# Patient Record
Sex: FEMALE | Race: WHITE
[De-identification: names, ages, dates, MRNs, and addresses within clinical notes are randomized per-mention and may not be internally consistent; named-entity substitution may affect disease eponyms.]

---

## 2017-06-30 ENCOUNTER — HOSPITAL ENCOUNTER (EMERGENCY)
Dept: HOSPITAL 10 - FTE | Age: 32
Discharge: HOME | End: 2017-06-30
Payer: MEDICAID

## 2017-06-30 VITALS
HEIGHT: 66 IN | HEIGHT: 66 IN | BODY MASS INDEX: 30.68 KG/M2 | WEIGHT: 190.92 LBS | BODY MASS INDEX: 30.68 KG/M2 | WEIGHT: 190.92 LBS
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
## 2017-06-30 DIAGNOSIS — Z23: ICD-10-CM

## 2017-06-30 DIAGNOSIS — S61.452A: Primary | ICD-10-CM

## 2017-06-30 PROCEDURE — 90471 IMMUNIZATION ADMIN: CPT

## 2017-06-30 PROCEDURE — 90715 TDAP VACCINE 7 YRS/> IM: CPT

## 2017-06-30 NOTE — ERD
ER Documentation


Chief Complaint


Date/Time


DATE: 6/30/17 


TIME: 19:50


Chief Complaint


dog bite to left hand at 1300





HPI


Is a 31-year-old female who presents the emergency department today complaining 

of a dog bite to her left hand.  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  She is not up-to-date on her 

vaccines.  Denies any previous trauma, fevers or chills.





ROS


All systems reviewed and are negative except as per history of present illness.





Medications


Home Meds


Active Scripts


Amoxicillin/Potassium Clav (Amox-Clav 875-125 mg Tablet) 875-125 mg Tab, 1 TAB 

PO BID for 7 Days, #14 TAB


   Prov:NAVNEET MORA PA-C         6/30/17


Acetaminophen* (Tylophen*) 500 Mg Capsule, 1 CAP PO Q6H Y for PAIN AND OR 

ELEVATED TEMP, #30 CAP


   Prov:NAVNEET MORAC         6/30/17


Ibuprofen* (Motrin*) 600 Mg Tab, 600 MG PO Q6, #30 TAB


   Prov:NAVNEET MORAC         6/30/17


Ibuprofen* (Motrin*) 600 Mg Tab, 600 MG PO Q6H Y for PAIN AND OR ELEVATED TEMP, 

#30 TAB


   Prov:PHILIP YODER NP         3/12/16


Reported Medications


Multivitamins* (Once Daily*) 1 Tab Tablet, 1 TAB PO DAILY, TAB


   9/13/15





Allergies


Allergies:  


Coded Allergies:  


     No Known Drug Allergy (Verified  Allergy, Unknown, 6/30/17)





PMhx/Soc


Medical and Surgical Hx:  pt denies Medical Hx


History of Surgery:  Yes (c/section x1, laparoscopic)


Anesthesia Reaction:  No


Hx Neurological Disorder:  No


Hx Respiratory Disorders:  No


Hx Cardiac Disorders:  No


Hx Psychiatric Problems:  No


Hx Miscellaneous Medical Probl:  No


Hx Alcohol Use:  No


Hx Substance Use:  No


Hx Tobacco Use:  No


Smoking Status:  Never smoker





Physical Exam


Vitals





Vital Signs








  Date Time  Temp Pulse Resp B/P Pulse Ox O2 Delivery O2 Flow Rate FiO2


 


6/30/17 19:25 98.6 79 20 121/72 100   








Physical Exam


Const:     No acute distress


Head:   Atraumatic 


Eyes:    Normal Conjunctiva


ENT:    Normal External Ears, Nose and Mouth.


Neck:               Full range of motion..~ No meningismus.


Resp:    Clear to auscultation bilaterally


Cardio:    Regular rate and rhythm, no murmurs


Abd:    Soft, non tender, non distended. Normal bowel sounds


Skin:   Left hand thenar eminence with evidence of superficial dog bite and 

mild blood.  No erythema or warmth.


MSK:   Left hand with no obvious deformity.  No effusion.  No ecchymosis.  

Evidence of superficial dog bite.  Pulses 2+.  Distal neurovascularly intact.  

Full active range of motion of thumb.  Nontender palpation thumb.


Neur:    Awake and alert


Psych:    Normal Mood and Affect


Results 24 hrs








 Current Medications








 Medications


  (Trade)  Dose


 Ordered  Sig/Sintia


 Route


 PRN Reason  Start Time


 Stop Time Status Last Admin


Dose Admin


 


 Diphtheria/


 Tetanus/Acell


 Pertussis


  (Adacel)  0.5 ml  ONCE ONCE


 IM*


   6/30/17 20:00


 6/30/17 20:01 DC 6/30/17 19:57


 


 


 Ibuprofen


  (Motrin)  600 mg  ONCE  ONCE


 PO


   6/30/17 20:00


 6/30/17 20:01 DC 6/30/17 19:57


 














Procedures/MDM


This a 31-year-old female presents the emergency department today for a dog 

bite that she sustained earlier today while cleaning a house.  Patient has 

evidence of a superficial dog bite over her thenar eminence of her left hand.  

She does have full active range of motion of her thumb and is nontender on the 

bones of her thumb is able to oppose all of her fingers.  Low suspicion for 

acute fracture dislocation.  I do not feel that she requires imaging at this 

time..  There is no erythema or warmth.  Patient symptoms at this time is 

consistent with dog bite.  She is not up-to-date on her tetanus.  She was given 

a tetanus here in the emergency department as well as Motrin.  Patient's wounds 

were explained here in the emergency department.  I will give her prescription 

for Augmentin, Tylenol and Motrin.  She was instructed to return in 48 hours 

for wound check if no improvement in symptoms or worsening of symptoms.





At this time the patient is stable for discharge and outpatient management. 

Patient should follow up with their PCP in the next 1-2 days.  They may return 

to the emergency department sooner for any persistent or worsening of symptoms.

  Patient understood and agreed with the plan.





Departure


Diagnosis:  


 Primary Impression:  


 Dog bite


 Encounter type:  initial encounter  Qualified Code:  W54.0XXA - Dog bite, 

initial encounter


Condition:  NAVNEET Auguste PA-C Jun 30, 2017 19:53

## 2017-08-07 ENCOUNTER — HOSPITAL ENCOUNTER (EMERGENCY)
Dept: HOSPITAL 10 - FTE | Age: 32
LOS: 1 days | Discharge: HOME | End: 2017-08-08
Payer: MEDICAID

## 2017-08-07 VITALS
BODY MASS INDEX: 31 KG/M2 | HEIGHT: 66 IN | HEIGHT: 66 IN | WEIGHT: 192.9 LBS | WEIGHT: 192.9 LBS | BODY MASS INDEX: 31 KG/M2

## 2017-08-07 DIAGNOSIS — R10.30: ICD-10-CM

## 2017-08-07 DIAGNOSIS — N93.8: Primary | ICD-10-CM

## 2017-08-07 LAB
BASOPHILS # BLD AUTO: 0.1 10^3/UL (ref 0–0.1)
BASOPHILS NFR BLD: 0.6 % (ref 0–2)
EOSINOPHIL # BLD: 0.2 10^3/UL (ref 0–0.5)
EOSINOPHIL NFR BLD: 2.2 % (ref 0–7)
ERYTHROCYTE [DISTWIDTH] IN BLOOD BY AUTOMATED COUNT: 13.2 % (ref 11.5–14.5)
HCT VFR BLD CALC: 37.4 % (ref 37–47)
HGB BLD-MCNC: 12.5 G/DL (ref 12–16)
LYMPHOCYTES # BLD AUTO: 3.6 10^3/UL (ref 0.8–2.9)
LYMPHOCYTES NFR BLD AUTO: 37.1 % (ref 15–51)
MCH RBC QN AUTO: 29.9 PG (ref 29–33)
MCHC RBC AUTO-ENTMCNC: 33.4 G/DL (ref 32–37)
MCV RBC AUTO: 89.5 FL (ref 82–101)
MONOCYTES # BLD: 0.6 10^3/UL (ref 0.3–0.9)
MONOCYTES NFR BLD: 6 % (ref 0–11)
NEUTROPHILS # BLD: 5.2 10^3/UL (ref 1.6–7.5)
NEUTROPHILS NFR BLD AUTO: 53.7 % (ref 39–77)
NRBC # BLD MANUAL: 0 10^3/UL (ref 0–0)
NRBC BLD AUTO-RTO: 0 /100WBC (ref 0–0)
PLATELET # BLD: 259 10^3/UL (ref 140–415)
PMV BLD AUTO: 10.5 FL (ref 7.4–10.4)
RBC # BLD AUTO: 4.18 10^6/UL (ref 4.2–5.4)
WBC # BLD AUTO: 9.7 10^3/UL (ref 4.8–10.8)

## 2017-08-07 PROCEDURE — 80053 COMPREHEN METABOLIC PANEL: CPT

## 2017-08-07 PROCEDURE — 85025 COMPLETE CBC W/AUTO DIFF WBC: CPT

## 2017-08-07 PROCEDURE — 83690 ASSAY OF LIPASE: CPT

## 2017-08-07 PROCEDURE — 76830 TRANSVAGINAL US NON-OB: CPT

## 2017-08-07 PROCEDURE — 74176 CT ABD & PELVIS W/O CONTRAST: CPT

## 2017-08-07 PROCEDURE — 36415 COLL VENOUS BLD VENIPUNCTURE: CPT

## 2017-08-07 PROCEDURE — 76856 US EXAM PELVIC COMPLETE: CPT

## 2017-08-07 PROCEDURE — 81001 URINALYSIS AUTO W/SCOPE: CPT

## 2017-08-07 NOTE — ERD
ER Documentation


Chief Complaint


Date/Time


DATE: 8/7/17 


TIME: 23:25


Chief Complaint


vag bleeding w/ back pain x  week





HPI


31-year-old female presents here in emergency department for complaints of 

vaginal bleeding for one, patient discussed obtaining a spotting. Patient is 

complaining of lower back pain with this. Sharp pains 4/10 scale, not better or 

worse with anything. Patient noticed abdominal area to be bloating and bigger. 

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 

back. Patient wants this to be checked. Patient denies any dizziness.





ROS


All systems reviewed and are negative except as per history of present illness.





Medications


Home Meds


Active Scripts


Amoxicillin/Potassium Clav (Amox-Clav 875-125 mg Tablet) 875-125 mg Tab, 1 TAB 

PO BID for 7 Days, #14 TAB


   Prov:NAVNEET MORAC         6/30/17


Acetaminophen* (Tylophen*) 500 Mg Capsule, 1 CAP PO Q6H Y for PAIN AND OR 

ELEVATED TEMP, #30 CAP


   Prov:NAVNEET MORA PA-C         6/30/17


Ibuprofen* (Motrin*) 600 Mg Tab, 600 MG PO Q6, #30 TAB


   Prov:NAVNEET MORA PA-C         6/30/17


Ibuprofen* (Motrin*) 600 Mg Tab, 600 MG PO Q6H Y for PAIN AND OR ELEVATED TEMP, 

#30 TAB


   Prov:PHILIP YODER NP         3/12/16


Reported Medications


Multivitamins* (Once Daily*) 1 Tab Tablet, 1 TAB PO DAILY, TAB


   9/13/15





Allergies


Allergies:  


Coded Allergies:  


     No Known Drug Allergy (Verified  Allergy, Unknown, 6/30/17)





PMhx/Soc


History of Surgery:  Yes (c/section x1, laparoscopic)


Anesthesia Reaction:  No


Hx Neurological Disorder:  No


Hx Respiratory Disorders:  No


Hx Cardiac Disorders:  No


Hx Psychiatric Problems:  No


Hx Miscellaneous Medical Probl:  No


Hx Alcohol Use:  No


Hx Substance Use:  No


Hx Tobacco Use:  No





FmHx


Family History:  No coronary disease, No diabetes, No other





Physical Exam


Vitals





Vital Signs








  Date Time  Temp Pulse Resp B/P Pulse Ox O2 Delivery O2 Flow Rate FiO2


 


8/7/17 21:22 98.5 86 20 135/90 100   








Physical Exam


GENERAL:  The patient is well developed and appropriate for usual state of 

health, in no apparent distress.


CHEST:  Clear to auscultation bilaterally. There are no rales, wheezes or 

rhonchi. 


HEART:  Regular rate and rhythm. No murmurs, clicks, rubs or gallops. No S3 or 

S4.


ABDOMEN:  Soft, nontender and nondistended. Good bowel sounds. No rebound or 

guarding. No gross peritonitis. No gross organomegaly or masses. No Vizcaino sign 

or McBurney point tenderness.


BACK:  No midline or flank tenderness.


EXTREMITIES:  Equal pulses bilaterally. There is no peripheral clubbing, 

cyanosis or edema. No focal swelling or erythema. Full range of motion. Grossly 

neurovascularly intact.


NEURO:  Alert and oriented. Cranial nerves 2-12 intact. Motor strength in all 4 

extremities with 5/5 strength.  Sensation grossly intact. Normal speech and 

gait. 


SKIN:  There is no apparent rash or petechia. The skin is warm and dry.


HEMATOLOGIC AND LYMPHATIC:  There is no evidence of excessive bruising or 

lymphedema. No gross cervical, axillary, or inguinal lymphadenopathy.


Result Diagram:  


8/7/17 2330 8/7/17 2330





Results 24 hrs





 Laboratory Tests








Test


  8/7/17


23:30


 


White Blood Count 9.710^3/ul 


 


Red Blood Count 4.1810^6/ul 


 


Hemoglobin 12.5g/dl 


 


Hematocrit 37.4% 


 


Mean Corpuscular Volume 89.5fl 


 


Mean Corpuscular Hemoglobin 29.9pg 


 


Mean Corpuscular Hemoglobin


Concent 33.4g/dl 


 


 


Red Cell Distribution Width 13.2% 


 


Platelet Count 55396^3/UL 


 


Mean Platelet Volume 10.5fl 


 


Neutrophils % 53.7% 


 


Lymphocytes % 37.1% 


 


Monocytes % 6.0% 


 


Eosinophils % 2.2% 


 


Basophils % 0.6% 


 


Nucleated Red Blood Cells % 0.0/100WBC 


 


Neutrophils # 5.210^3/ul 


 


Lymphocytes # 3.610^3/ul 


 


Monocytes # 0.610^3/ul 


 


Eosinophils # 0.210^3/ul 


 


Basophils # 0.110^3/ul 


 


Nucleated Red Blood Cells # 0.010^3/ul 


 


Urine Color STRAW 


 


Urine Clarity CLEAR 


 


Urine pH 7.0 


 


Urine Specific Gravity 1.012 


 


Urine Ketones NEGATIVEmg/dL 


 


Urine Nitrite NEGATIVEmg/dL 


 


Urine Bilirubin NEGATIVEmg/dL 


 


Urine Urobilinogen NEGATIVEmg/dL 


 


Urine Leukocyte Esterase 2+Noble/ul 


 


Urine Microscopic RBC 0/HPF 


 


Urine Microscopic WBC 5/HPF 


 


Urine Squamous Epithelial


Cells FEW/HPF 


 


 


Urine Hemoglobin NEGATIVEmg/dL 


 


Urine Glucose NEGATIVEmg/dL 


 


Urine Total Protein NEGATIVEmg/dl 


 


Sodium Level 143mmol/L 


 


Potassium Level 3.7mmol/L 


 


Chloride Level 101mmol/L 


 


Carbon Dioxide Level 24mmol/L 


 


Anion Gap 22 


 


Blood Urea Nitrogen 16mg/dl 


 


Creatinine 0.69mg/dl 


 


Glucose Level 91mg/dl 


 


Calcium Level 9.8mg/dl 


 


Total Bilirubin 0.1mg/dl 


 


Direct Bilirubin 0.00mg/dl 


 


Indirect Bilirubin 0.1mg/dl 


 


Aspartate Amino Transf


(AST/SGOT) 24IU/L 


 


 


Alanine Aminotransferase


(ALT/SGPT) 39IU/L 


 


 


Alkaline Phosphatase 67IU/L 


 


Total Protein 8.1g/dl 


 


Albumin 4.5g/dl 


 


Globulin 3.60g/dl 


 


Albumin/Globulin Ratio 1.25 


 


Lipase 60U/L 





PROCEDURE: ULTRASOUND EVALUATION OF THE FEMALE PELVIS:   


 


CLINICAL INDICATION: 31 years  of age, female,  spotting.


 


COMPARISON:   None.


 


TECHNIQUE: Real-time sonographic images of the pelvis were obtained 

transabdominally and transvaginally utilizing gray scale, color, and Doppler 

imaging.


 


FINDINGS:


LMP: July 13, 2017


 


 


Uterus:


Appearance: Normal.


Position:  Anteverted. 


Size:   8.7 x 4.7 x 6.8  cm. (144 ml) 


Endometrial stripe:  6.6   mm.


 


Right ovary and adnexa:


Size: 3.4 x 2.2 x 2.2  cm. (Volume 8.5 mL)


Appearance: Normal morphology. No masses. Arterial and venous flow present.


 


 


Left ovary and adnexa:


Left ovary is not visualized.  Negative for mass in the left adnexa.


 


Free fluid: None


 


IMPRESSION:


 


1.    Uterus and endometrium appear normal for a premenopausal patient.  

Recommend correlation with serum beta HCG to rule out pregnancy.  If there is 

clinical concern for an endometrial polyp, the patient may benefit from a non 

urgent hysterosonogram.


2. The left ovary is not visualized.  Negative for evidence of left adnexal 

mass.


 


 


RPTAT: HCTS


_____________________________________________ 


Physician Kieran           Date    Time 


Electronically viewed and signed by Physician Kieran on 08/08/2017 00:

09 


 


D:  08/08/2017 00:09  T:  08/08/2017 00:09


CS/





CC: PETRA ALARCON NP


PROCEDURE:   CT abdomen and pelvis without intravenous contrast. 


 


CLINICAL INDICATION: Pain.


 


TECHNIQUE:   CT of the abdomen/pelvis was performed utilizing axial images with 

reconstructions in sagittal and coronal planes. The administered radiation dose 

is CTDI 17 mGy,  mGy-cm. 


 


COMPARISON: No pertinent prior examinations were submitted for comparison.


 


FINDINGS:


 


Visualized Chest: A calcified granuloma is noted in the right lower lobe.


 


Abdomen:


 


The  spleen, pancreas, gallbladder,and adrenal glands are unremarkable.   The 

liver is markedly, diffusely decreased in attenuation, compatible with hepatic 

steatosis.


 


The kidneys are without hydronephrosis.  No definite urinary calculi are seen.


 


There is no evidence of bowel obstruction.  The appendix is not seen.  There is 

no evidence of acute appendicitis.  No intra-abdominal free air is seen.  Some 

increased formed stool is noted throughout the colon.


 


There is no evidence of intra-abdominal adenopathy or free fluid.  


 


 


Pelvis:


 


There is no evidence of pelvic adenopathy.  The uterus and ovaries are without 

enlargement.  The urinary bladder is unremarkable.  There is trace pelvic free 

fluid.


 


Osseous structures: Unremarkable.


 


IMPRESSION:


 


No acute findings.


 


Hepatic steatosis.


 


RPTAT: HIKT


_____________________________________________ 


.Rashel Young MD, MD           Date    Time 


Electronically viewed and signed by .Rashel Young MD, MD on 08/08/2017 01:44 


 


D:  08/08/2017 01:44  T:  08/08/2017 01:44


.T/





CC: PETRA ALARCON NP





Procedures/MDM


Medical Decision Making: Patient's lower back pain and vaginal bleeding 

nonspecific at this time, possible can be from dysfunctional uterine bleeding. 

Bleeding is controlled, only more of a spotting, hemoglobin and hematocrit is 

stable at this time. No pregnancy noted.  There is low suspicion for abdominal 

emergencies at this time. Patients abdominal exam is normal at this time. 

Patients radiology exam does not show any abdominal emergencies at this time. 

There is low suspicion for appendicitis, cholecystitis, abdominal aortic 

aneurysms or peritonitis at this time.  There is low suspicion for sepsis. 

Patient appears well and is hemodynamically stable.





Disposition: Home. Condition: Stable


Prescription ibuprofen, Tylenol, ferrous sulfate


Instructions: Patient is advised to take medications as prescribed. Patient is 

advised to rest, increase fluid intake and see OB doctor for further evaluation 

and symptoms. Patient is advised that if symptoms are worse, severe abdominal 

pain, uncontrolled vomiting, high fever, severe flank pain, worst signs and 

symptoms, to return to the emergency department immediately.  Otherwise, 

patient can follow up with primary care doctor in 5-7 days.





Departure


Diagnosis:  


 Primary Impression:  


 Vaginal bleeding


 Additional Impression:  


 Abdominal pain


 Abdominal location:  lower abdomen, unspecified  Qualified Code:  R10.30 - 

Lower abdominal pain


Condition:  Stable


Patient Instructions:  Dysfunctional Uterine Bleeding





Additional Instructions:  


 Patient is advised to take medications as prescribed. Patient is advised to 

rest, increase fluid intake and see OB doctor for further evaluation and 

symptoms. Patient is advised that if symptoms are worse, severe abdominal pain, 

uncontrolled vomiting, high fever, severe flank pain, worst signs and symptoms, 

to return to the emergency department immediately.  Otherwise, patient can 

follow up with primary care doctor in 5-7 days.











PETRA ALARCON NP Aug 7, 2017 23:26

## 2017-08-08 VITALS
HEART RATE: 62 BPM | DIASTOLIC BLOOD PRESSURE: 77 MMHG | TEMPERATURE: 98.3 F | SYSTOLIC BLOOD PRESSURE: 115 MMHG | RESPIRATION RATE: 17 BRPM

## 2017-08-08 LAB
ADD UMIC: YES
ALBUMIN SERPL-MCNC: 4.5 G/DL (ref 3.3–4.9)
ALBUMIN/GLOB SERPL: 1.25 {RATIO}
ALP SERPL-CCNC: 67 IU/L (ref 42–121)
ALT SERPL-CCNC: 39 IU/L (ref 13–69)
ANION GAP SERPL CALC-SCNC: 22 MMOL/L (ref 8–16)
AST SERPL-CCNC: 24 IU/L (ref 15–46)
BILIRUB DIRECT SERPL-MCNC: 0 MG/DL (ref 0–0.2)
BILIRUB SERPL-MCNC: 0.1 MG/DL (ref 0.2–1.3)
BUN SERPL-MCNC: 16 MG/DL (ref 7–20)
CALCIUM SERPL-MCNC: 9.8 MG/DL (ref 8.4–10.2)
CHLORIDE SERPL-SCNC: 101 MMOL/L (ref 97–110)
CO2 SERPL-SCNC: 24 MMOL/L (ref 21–31)
COLOR UR: (no result)
CREAT SERPL-MCNC: 0.69 MG/DL (ref 0.44–1)
GLOBULIN SER-MCNC: 3.6 G/DL (ref 1.3–3.2)
GLUCOSE SERPL-MCNC: 91 MG/DL (ref 70–220)
GLUCOSE UR STRIP-MCNC: NEGATIVE MG/DL
KETONES UR STRIP.AUTO-MCNC: NEGATIVE MG/DL
NITRITE UR QL STRIP.AUTO: NEGATIVE MG/DL
POTASSIUM SERPL-SCNC: 3.7 MMOL/L (ref 3.5–5.1)
PROT SERPL-MCNC: 8.1 G/DL (ref 6.1–8.1)
RBC # UR AUTO: NEGATIVE MG/DL
SODIUM SERPL-SCNC: 143 MMOL/L (ref 135–144)
SQUAMOUS #/AREA URNS HPF: (no result) /HPF
UR ASCORBIC ACID: NEGATIVE MG/DL
UR BILIRUBIN (DIP): NEGATIVE MG/DL
UR CLARITY: CLEAR
UR PH (DIP): 7 (ref 5–9)
UR RBC: 0 /HPF (ref 0–5)
UR SPECIFIC GRAVITY (DIP): 1.01 (ref 1–1.03)
UR TOTAL PROTEIN (DIP): NEGATIVE MG/DL
UROBILINOGEN UR STRIP-ACNC: NEGATIVE MG/DL
WBC # UR STRIP: (no result) LEU/UL

## 2017-08-08 NOTE — RADRPT
PROCEDURE:   CT abdomen and pelvis without intravenous contrast. 

 

CLINICAL INDICATION: Pain.

 

TECHNIQUE:   CT of the abdomen/pelvis was performed utilizing axial images with reconstructions in s
agittal and coronal planes. The administered radiation dose is CTDI 17 mGy,  mGy-cm. 

 

COMPARISON: No pertinent prior examinations were submitted for comparison.

 

FINDINGS:

 

Visualized Chest: A calcified granuloma is noted in the right lower lobe.

 

Abdomen:

 

The  spleen, pancreas, gallbladder,and adrenal glands are unremarkable.   The liver is markedly, dif
fusely decreased in attenuation, compatible with hepatic steatosis.

 

The kidneys are without hydronephrosis.  No definite urinary calculi are seen.

 

There is no evidence of bowel obstruction.  The appendix is not seen.  There is no evidence of acute
 appendicitis.  No intra-abdominal free air is seen.  Some increased formed stool is noted throughou
t the colon.

 

There is no evidence of intra-abdominal adenopathy or free fluid.  

 

 

Pelvis:

 

There is no evidence of pelvic adenopathy.  The uterus and ovaries are without enlargement.  The uri
nary bladder is unremarkable.  There is trace pelvic free fluid.

 

Osseous structures: Unremarkable.

 

IMPRESSION:

 

No acute findings.

 

Hepatic steatosis.

 

RPTAT: HIKT

_____________________________________________ 

.Rashel Young MD, MD           Date    Time 

Electronically viewed and signed by .Rashel Young MD, MD on 08/08/2017 01:44 

 

D:  08/08/2017 01:44  T:  08/08/2017 01:44

.T/

## 2017-08-08 NOTE — RADRPT
PROCEDURE: ULTRASOUND EVALUATION OF THE FEMALE PELVIS:   

 

CLINICAL INDICATION: 31 years  of age, female,  spotting.

 

COMPARISON:   None.

 

TECHNIQUE: Real-time sonographic images of the pelvis were obtained transabdominally and transvagina
lly utilizing gray scale, color, and Doppler imaging.

 

FINDINGS:

LMP: July 13, 2017

 

 

Uterus:

Appearance: Normal.

Position:  Anteverted. 

Size:   8.7 x 4.7 x 6.8  cm. (144 ml) 

Endometrial stripe:  6.6   mm.

 

Right ovary and adnexa:

Size: 3.4 x 2.2 x 2.2  cm. (Volume 8.5 mL)

Appearance: Normal morphology. No masses. Arterial and venous flow present.

 

 

Left ovary and adnexa:

Left ovary is not visualized.  Negative for mass in the left adnexa.

 

Free fluid: None

 

IMPRESSION:

 

1.    Uterus and endometrium appear normal for a premenopausal patient.  Recommend correlation with 
serum beta HCG to rule out pregnancy.  If there is clinical concern for an endometrial polyp, the pa
tient may benefit from a non urgent hysterosonogram.

2. The left ovary is not visualized.  Negative for evidence of left adnexal mass.

 

 

RPTAT: HCTS

_____________________________________________ 

Physician Kieran           Date    Time 

Electronically viewed and signed by Physician Kieran on 08/08/2017 00:09 

 

D:  08/08/2017 00:09  T:  08/08/2017 00:09

/

## 2018-03-17 ENCOUNTER — HOSPITAL ENCOUNTER (EMERGENCY)
Dept: HOSPITAL 91 - E/R | Age: 33
Discharge: HOME | End: 2018-03-17
Payer: MEDICAID

## 2018-03-17 ENCOUNTER — HOSPITAL ENCOUNTER (EMERGENCY)
Age: 33
Discharge: HOME | End: 2018-03-17

## 2018-03-17 DIAGNOSIS — R40.2142: ICD-10-CM

## 2018-03-17 DIAGNOSIS — R40.2362: ICD-10-CM

## 2018-03-17 DIAGNOSIS — G44.209: ICD-10-CM

## 2018-03-17 DIAGNOSIS — R42: Primary | ICD-10-CM

## 2018-03-17 DIAGNOSIS — N30.90: ICD-10-CM

## 2018-03-17 DIAGNOSIS — R40.2252: ICD-10-CM

## 2018-03-17 LAB
ADD MAN DIFF?: NO
ADD UMIC: YES
ALANINE AMINOTRANSFERASE: 69 IU/L (ref 13–69)
ALBUMIN/GLOBULIN RATIO: 1.34
ALBUMIN: 4.3 G/DL (ref 3.3–4.9)
ALKALINE PHOSPHATASE: 69 IU/L (ref 42–121)
ANION GAP: 19 (ref 8–16)
ASPARTATE AMINO TRANSFERASE: 51 IU/L (ref 15–46)
BASOPHIL #: 0 10^3/UL (ref 0–0.1)
BASOPHILS %: 0.5 % (ref 0–2)
BILIRUBIN,DIRECT: 0 MG/DL (ref 0–0.2)
BILIRUBIN,TOTAL: 0 MG/DL (ref 0.2–1.3)
BLOOD UREA NITROGEN: 10 MG/DL (ref 7–20)
CALCIUM: 9.2 MG/DL (ref 8.4–10.2)
CARBON DIOXIDE: 26 MMOL/L (ref 21–31)
CHLORIDE: 107 MMOL/L (ref 97–110)
CREATININE: 0.6 MG/DL (ref 0.44–1)
EOSINOPHILS #: 0.2 10^3/UL (ref 0–0.5)
EOSINOPHILS %: 2.5 % (ref 0–7)
GLOBULIN: 3.2 G/DL (ref 1.3–3.2)
GLUCOSE: 86 MG/DL (ref 70–220)
HEMATOCRIT: 36.8 % (ref 37–47)
HEMOGLOBIN: 12.2 G/DL (ref 12–16)
LIPASE: 42 U/L (ref 23–300)
LYMPHOCYTES #: 2.1 10^3/UL (ref 0.8–2.9)
LYMPHOCYTES %: 35.2 % (ref 15–51)
MEAN CORPUSCULAR HEMOGLOBIN: 29.5 PG (ref 29–33)
MEAN CORPUSCULAR HGB CONC: 33.2 G/DL (ref 32–37)
MEAN CORPUSCULAR VOLUME: 88.9 FL (ref 82–101)
MEAN PLATELET VOLUME: 11.2 FL (ref 7.4–10.4)
MONOCYTE #: 0.4 10^3/UL (ref 0.3–0.9)
MONOCYTES %: 7.1 % (ref 0–11)
NEUTROPHIL #: 3.2 10^3/UL (ref 1.6–7.5)
NEUTROPHILS %: 54.2 % (ref 39–77)
NUCLEATED RED BLOOD CELLS #: 0 10^3/UL (ref 0–0)
NUCLEATED RED BLOOD CELLS%: 0 /100WBC (ref 0–0)
PLATELET COUNT: 247 10^3/UL (ref 140–415)
POTASSIUM: 4.6 MMOL/L (ref 3.5–5.1)
RED BLOOD COUNT: 4.14 10^6/UL (ref 4.2–5.4)
RED CELL DISTRIBUTION WIDTH: 13.9 % (ref 11.5–14.5)
SODIUM: 147 MMOL/L (ref 135–144)
TOTAL PROTEIN: 7.5 G/DL (ref 6.1–8.1)
UR ASCORBIC ACID: NEGATIVE MG/DL
UR BACTERIA: (no result) /HPF
UR BILIRUBIN (DIP): NEGATIVE MG/DL
UR BLOOD (DIP): (no result) MG/DL
UR CLARITY: (no result)
UR COLOR: (no result)
UR GLUCOSE (DIP): NEGATIVE MG/DL
UR KETONES (DIP): NEGATIVE MG/DL
UR LEUKOCYTE ESTERASE (DIP): (no result) LEU/UL
UR NITRITE (DIP): NEGATIVE MG/DL
UR PH (DIP): 7 (ref 5–9)
UR RBC: 2 /HPF (ref 0–5)
UR SPECIFIC GRAVITY (DIP): 1 (ref 1–1.03)
UR SQUAMOUS EPITHELIAL CELL: (no result) /HPF
UR TOTAL PROTEIN (DIP): NEGATIVE MG/DL
UR UROBILINOGEN (DIP): NEGATIVE MG/DL
UR WBC: 7 /HPF (ref 0–5)
WHITE BLOOD COUNT: 5.9 10^3/UL (ref 4.8–10.8)

## 2018-03-17 PROCEDURE — 70450 CT HEAD/BRAIN W/O DYE: CPT

## 2018-03-17 PROCEDURE — 83690 ASSAY OF LIPASE: CPT

## 2018-03-17 PROCEDURE — 96374 THER/PROPH/DIAG INJ IV PUSH: CPT

## 2018-03-17 PROCEDURE — 85025 COMPLETE CBC W/AUTO DIFF WBC: CPT

## 2018-03-17 PROCEDURE — 36415 COLL VENOUS BLD VENIPUNCTURE: CPT

## 2018-03-17 PROCEDURE — 96375 TX/PRO/DX INJ NEW DRUG ADDON: CPT

## 2018-03-17 PROCEDURE — 99285 EMERGENCY DEPT VISIT HI MDM: CPT

## 2018-03-17 PROCEDURE — 81001 URINALYSIS AUTO W/SCOPE: CPT

## 2018-03-17 PROCEDURE — 80053 COMPREHEN METABOLIC PANEL: CPT

## 2018-03-17 PROCEDURE — 81025 URINE PREGNANCY TEST: CPT

## 2018-03-17 RX ADMIN — KETOROLAC TROMETHAMINE 1 MG: 15 INJECTION, SOLUTION INTRAMUSCULAR; INTRAVENOUS at 15:23

## 2018-03-17 RX ADMIN — ONDANSETRON HYDROCHLORIDE 1 MG: 2 INJECTION, SOLUTION INTRAMUSCULAR; INTRAVENOUS at 13:40

## 2018-03-17 RX ADMIN — LIDOCAINE HYDROCHLORIDE 1 MLS/HR: 10 INJECTION, SOLUTION EPIDURAL; INFILTRATION; INTRACAUDAL; PERINEURAL at 13:39

## 2018-03-17 RX ADMIN — CEPHALEXIN 1 MG: 500 CAPSULE ORAL at 15:22

## 2018-03-17 RX ADMIN — LORAZEPAM 1 MG: 0.5 TABLET ORAL at 13:40

## 2019-02-03 ENCOUNTER — HOSPITAL ENCOUNTER (EMERGENCY)
Dept: HOSPITAL 91 - FTE | Age: 34
LOS: 1 days | Discharge: HOME | End: 2019-02-04
Payer: MEDICAID

## 2019-02-03 ENCOUNTER — HOSPITAL ENCOUNTER (EMERGENCY)
Dept: HOSPITAL 10 - FTE | Age: 34
LOS: 1 days | Discharge: HOME | End: 2019-02-04
Payer: MEDICAID

## 2019-02-03 VITALS
BODY MASS INDEX: 31.53 KG/M2 | HEIGHT: 66 IN | WEIGHT: 196.21 LBS | WEIGHT: 196.21 LBS | BODY MASS INDEX: 31.53 KG/M2 | HEIGHT: 66 IN

## 2019-02-03 DIAGNOSIS — K59.00: Primary | ICD-10-CM

## 2019-02-03 DIAGNOSIS — R40.2412: ICD-10-CM

## 2019-02-03 DIAGNOSIS — R10.2: ICD-10-CM

## 2019-02-03 LAB
ADD MAN DIFF?: NO
ADD UMIC: YES
BASOPHIL #: 0 10^3/UL (ref 0–0.1)
BASOPHILS %: 0.5 % (ref 0–2)
EOSINOPHILS #: 0.2 10^3/UL (ref 0–0.5)
EOSINOPHILS %: 2.3 % (ref 0–7)
HEMATOCRIT: 38.2 % (ref 37–47)
HEMOGLOBIN: 12.2 G/DL (ref 12–16)
IMMATURE GRANS #M: 0.04 10^3/UL (ref 0–0.03)
IMMATURE GRANS % (M): 0.5 % (ref 0–0.43)
LYMPHOCYTES #: 3 10^3/UL (ref 0.8–2.9)
LYMPHOCYTES %: 34.3 % (ref 15–51)
MEAN CORPUSCULAR HEMOGLOBIN: 28.9 PG (ref 29–33)
MEAN CORPUSCULAR HGB CONC: 31.9 G/DL (ref 32–37)
MEAN CORPUSCULAR VOLUME: 90.5 FL (ref 82–101)
MEAN PLATELET VOLUME: 10.3 FL (ref 7.4–10.4)
MONOCYTE #: 0.5 10^3/UL (ref 0.3–0.9)
MONOCYTES %: 6 % (ref 0–11)
NEUTROPHIL #: 4.9 10^3/UL (ref 1.6–7.5)
NEUTROPHILS %: 56.4 % (ref 39–77)
NUCLEATED RED BLOOD CELLS #: 0 10^3/UL (ref 0–0)
NUCLEATED RED BLOOD CELLS%: 0 /100WBC (ref 0–0)
PLATELET COUNT: 265 10^3/UL (ref 140–415)
RED BLOOD COUNT: 4.22 10^6/UL (ref 4.2–5.4)
RED CELL DISTRIBUTION WIDTH: 13.8 % (ref 11.5–14.5)
UR ASCORBIC ACID: 20 MG/DL
UR BILIRUBIN (DIP): NEGATIVE MG/DL
UR BLOOD (DIP): (no result) MG/DL
UR CLARITY: (no result)
UR COLOR: YELLOW
UR GLUCOSE (DIP): NEGATIVE MG/DL
UR KETONES (DIP): NEGATIVE MG/DL
UR LEUKOCYTE ESTERASE (DIP): (no result) LEU/UL
UR MUCUS: (no result) /HPF
UR NITRITE (DIP): NEGATIVE MG/DL
UR PH (DIP): 6 (ref 5–9)
UR RBC: 11 /HPF (ref 0–5)
UR SPECIFIC GRAVITY (DIP): 1.02 (ref 1–1.03)
UR SQUAMOUS EPITHELIAL CELL: (no result) /HPF
UR TOTAL PROTEIN (DIP): NEGATIVE MG/DL
UR UROBILINOGEN (DIP): NEGATIVE MG/DL
UR WBC: 14 /HPF (ref 0–5)
URINE BLOOD (DIP) POC: (no result)
URINE PH (DIP) POC: 7 (ref 5–8.5)
WHITE BLOOD COUNT: 8.6 10^3/UL (ref 4.8–10.8)

## 2019-02-03 PROCEDURE — 81025 URINE PREGNANCY TEST: CPT

## 2019-02-03 PROCEDURE — 80048 BASIC METABOLIC PNL TOTAL CA: CPT

## 2019-02-03 PROCEDURE — 85025 COMPLETE CBC W/AUTO DIFF WBC: CPT

## 2019-02-03 PROCEDURE — 86901 BLOOD TYPING SEROLOGIC RH(D): CPT

## 2019-02-03 PROCEDURE — 76856 US EXAM PELVIC COMPLETE: CPT

## 2019-02-03 PROCEDURE — 96372 THER/PROPH/DIAG INJ SC/IM: CPT

## 2019-02-03 PROCEDURE — 74177 CT ABD & PELVIS W/CONTRAST: CPT

## 2019-02-03 PROCEDURE — 81003 URINALYSIS AUTO W/O SCOPE: CPT

## 2019-02-03 PROCEDURE — 81001 URINALYSIS AUTO W/SCOPE: CPT

## 2019-02-03 PROCEDURE — 76830 TRANSVAGINAL US NON-OB: CPT

## 2019-02-03 PROCEDURE — 86900 BLOOD TYPING SEROLOGIC ABO: CPT

## 2019-02-03 PROCEDURE — 99285 EMERGENCY DEPT VISIT HI MDM: CPT

## 2019-02-03 PROCEDURE — 36415 COLL VENOUS BLD VENIPUNCTURE: CPT

## 2019-02-03 PROCEDURE — 84702 CHORIONIC GONADOTROPIN TEST: CPT

## 2019-02-03 RX ADMIN — ACETAMINOPHEN 1 MG: 325 TABLET, FILM COATED ORAL at 21:45

## 2019-02-03 NOTE — ERD
ER Documentation


Chief Complaint


Chief Complaint





back/pelvic pain; LLQ ab pain, LMP 2 weeks ago





HPI


33-year-old female complaining of nausea, vomiting, abdominal bloating, and 


headache times 1 week.  Patient reports positive home pregnancy test 1 week ago.


 2 days ago, she noticed blood during bowel movement.  States that it was fresh 


blood.  She started having left-sided pelvic pain since then.  Today, she did 


home pregnancy test again twice.  Both times were negative.  Patient still feel 


bloated with headache and nausea, but no longer vomiting.  Her LMP was 


1/16/2019, it was lighter than normal.  Prior to that started on 12/23/2018.  In


addition, patient states that she has been constipated the, and has been taking 


fiber supplements.  Denies fever or chills.  Denies abdominal pain, or diarrhea.


 Denies dysuria.





ROS


All systems reviewed and are negative except as per history of present illness.





Medications


Home Meds


Active Scripts


Doxycycline Hyclate* (Doxycycline Hyclate*) 100 Mg Tablet.dr, 100 MG PO BID for 


10 Days, TAB


   Prov:PHILIP YODER NP         2/4/19


Docusate Sodium* (Colace*) 100 Mg Capsule, 100 MG PO DAILY, #30 CAP


   Prov:PHILIP YODER. NP         2/4/19


Magnesium Citrate* (Magnesium Citrate*) 296 Ml Solution, 296 ML PO ONCE, #1 


BOTTLE


   Prov:PHILIP YODER. NP         2/4/19


Ibuprofen* (Motrin*) 600 Mg Tab, 600 MG PO Q6H PRN for PAIN AND OR ELEVATED 


TEMP, #30 TAB


   Prov:PHILIP YODER. NP         2/4/19


Ondansetron Hcl* (Zofran*) 4 Mg Tablet, 4 MG PO Q6H for NAUSEA AND/OR VOMITING, 


#15 TAB


   Prov:CUAUHTEMOC RICE MD         3/17/18


Cephalexin* (Keflex*) 500 Mg Capsule, 500 MG PO QID for 5 Days, CAP


   Prov:CUAUHTEMOC RICE MD         3/17/18





Allergies


Allergies:  


Coded Allergies:  


     No Known Drug Allergy (Verified  Allergy, Unknown, 6/30/17)





PMhx/Soc


Medical and Surgical Hx:  pt denies Medical Hx, pt denies Surgical Hx


History of Surgery:  No


Anesthesia Reaction:  No


Hx Neurological Disorder:  No


Hx Respiratory Disorders:  No


Hx Cardiac Disorders:  No


Hx Psychiatric Problems:  No


Hx Miscellaneous Medical Probl:  No


Hx Alcohol Use:  No


Hx Substance Use:  No


Hx Tobacco Use:  No


Smoking Status:  Never smoker





Physical Exam


Vitals





Vital Signs


  Date      Temp  Pulse  Resp  B/P (MAP)   Pulse Ox  O2          O2 Flow    FiO2


Time                                                 Delivery    Rate


    2/4/19  98.2     72    18  92/52 (65)        99  Room Air


     04:09


    2/3/19  99.0     80    18      119/70       100


     20:11                           (86)





Physical Exam


General:    Well-developed, well-nourished, conscious and coherent, in no 


distress


Skin:    Warm and dry without rash, good texture and turgor


Head:    Normocephalic without evidence of trauma


Chest:    Normal AP diameter.  Good expansion without retractions.  Nontender.  


Lungs are clear to auscultate bilaterally with good tidal volume


Heart:    Regular rate and rhythm.  No murmur, rub, or gallops heard


Abdomen:    Soft and nontender without masses, guarding, or rebound.  Bowel 


sounds are active.  No hepatosplenomegaly


Back:    Without spinal or CVA tenderness


Pelvis:     Left pelvic tenderness


:   External genitalia without lesions or masses.  Vaginal vault clear.  


Cervix normal, positive cervical motion tenderness.  Uterus nontender and normal


size.  No adnexal tenderness or masses noted.


Extremities:    Full range of motion.  Good strength bilaterally.  No erythema, 


ecchymosis, or edema. Peripheral pulses are intact. Sensation intact


Neuro:    Alert and oriented 4, GCS 15.


Result Diagram:  


2/4/19 0146                                                                     


          2/4/19 0146





Results 24 hrs





Laboratory Tests


Test
                   2/3/19
21:30  2/3/19
21:33  2/3/19
21:34    2/3/19
21:44


Urine Color          YELLOW


Urine Clarity        CLOUDY


Urine pH                        6.0


Urine Specific                1.021


Gravity


Urine Ketones        NEGATIVE mg/dL


Urine Nitrite        NEGATIVE mg/dL


Urine Bilirubin      NEGATIVE mg/dL


Urine Urobilinogen   NEGATIVE mg/dL


Urine Leukocyte           1+ Noble/ul


Esterase


Urine Microscopic           11 /HPF


RBC


Urine Microscopic           14 /HPF


WBC


Urine Squamous       MODERATE /HPF 
  
             
             



Epithelial
Cells


Urine Mucus          FEW /HPF


Urine Hemoglobin           1+ mg/dL


Urine Glucose        NEGATIVE mg/dL


Urine Total Protein  NEGATIVE mg/dl


Bedside Urine pH                              7.0


(LAB)


Bedside Urine                         Trace


Protein (LAB)


Bedside Urine                         Negative


Glucose (UA)


Bedside Urine                         Negative


Ketones (LAB)


Bedside Urine Blood                            2+


Bedside Urine                         Negative


Nitrite (LAB)


Bedside Urine        
                Trace 
       
             



Leukocyte
Esterase


(L


POC Beta HCG,                                       NEGATIVE


Qualitative


White Blood Count                                                   8.6 10^3/ul


Red Blood Count                                                    4.22 10^6/ul


Hemoglobin                                                            12.2 g/dl


Hematocrit                                                               38.2 %


Mean Corpuscular                                                        90.5 fl


Volume


Mean Corpuscular                                                        28.9 pg


Hemoglobin


Mean Corpuscular     
                
             
                31.9 g/dl 



Hemoglobin
Concent


Red Cell                                                                 13.8 %


Distribution Width


Platelet Count                                                      265 10^3/UL


Mean Platelet                                                           10.3 fl


Volume


Immature                                                                0.500 %


Granulocytes %


Neutrophils %                                                            56.4 %


Lymphocytes %                                                            34.3 %


Monocytes %                                                               6.0 %


Eosinophils %                                                             2.3 %


Basophils %                                                               0.5 %


Nucleated Red Blood                                                 0.0 /100WBC


Cells %


Immature                                                          0.040 10^3/ul


Granulocytes #


Neutrophils #                                                       4.9 10^3/ul


Lymphocytes #                                                       3.0 10^3/ul


Monocytes #                                                         0.5 10^3/ul


Eosinophils #                                                       0.2 10^3/ul


Basophils #                                                         0.0 10^3/ul


Nucleated Red Blood                                                 0.0 10^3/ul


Cells #


Beta HCG,                                                         < 2.4 mIU/ml


Quantitative


Test
                   2/4/19
01:46  
             
             



White Blood Count       9.4 10^3/ul


Red Blood Count        4.05 10^6/ul


Hemoglobin                11.7 g/dl


Hematocrit                   36.1 %


Mean Corpuscular            89.1 fl


Volume


Mean Corpuscular            28.9 pg


Hemoglobin


Mean Corpuscular         32.4 g/dl 
  
             
             



Hemoglobin
Concent


Red Cell                     13.8 %


Distribution Width


Platelet Count          238 10^3/UL


Mean Platelet               10.1 fl


Volume


Immature                    0.500 %


Granulocytes %


Neutrophils %                58.4 %


Lymphocytes %                32.9 %


Monocytes %                   5.5 %


Eosinophils %                 2.1 %


Basophils %                   0.6 %


Nucleated Red Blood     0.0 /100WBC


Cells %


Immature              0.050 10^3/ul


Granulocytes #


Neutrophils #           5.5 10^3/ul


Lymphocytes #           3.1 10^3/ul


Monocytes #             0.5 10^3/ul


Eosinophils #           0.2 10^3/ul


Basophils #             0.1 10^3/ul


Nucleated Red Blood     0.0 10^3/ul


Cells #


Sodium Level             140 mmol/L


Potassium Level          4.1 mmol/L


Chloride Level           107 mmol/L


Carbon Dioxide            25 mmol/L


Level


Anion Gap                         8


Blood Urea Nitrogen        17 mg/dl


Creatinine               0.56 mg/dl


Est Glomerular       > 60 mL/min 
    
             
             



Filtrat Rate
mL/min


Glucose Level              92 mg/dl


Calcium Level             9.3 mg/dl





Current Medications


 Medications
   Dose
          Sig/Sintia
       Start Time
   Status  Last


 (Trade)       Ordered        Route
 PRN     Stop Time              Admin
Dose


                              Reason                                Admin


                650 mg         ONCE  ONCE
    2/3/19        DC            2/3/19


Acetaminophen                 PO
            22:00
 2/3/19                21:45




  (Tylenol                                  22:01


Tab)


 Sodium         100 ml @ ud    [facility]        2/4/19        DC       



Chloride                      ONCE
 .ROUTE
  00:41
 2/4/19


                                             00:42


 Iohexol
       150 ml         [facility]        2/4/19        DC       



(Omnipaque                    ONCE
 .ROUTE
  00:41
 2/4/19


300mg/
 ml)                                  00:42


 Ceftriaxone    250 mg         ONCE  ONCE
    2/4/19        DC            2/4/19


Sodium
                       IM
            04:00
 2/4/19                04:01



(Rocephin)                                   04:01


PROCEDURE:   US Pelvis. 


 


CLINICAL INDICATION:   Pelvic pain, left side


 


TECHNIQUE:   Multiple sonographic images of the pelvis were obtained utilizing a


transabdominal and endovaginal technique.   The images were reviewed on a PACS 


workstation. 


 


COMPARISON:   Pelvic ultrasound 08/07/2017. CT abdomen and pelvis 08/08/2017


 


FINDINGS:


 


Uterus: Normal in size, contour and echogenicity with no evidence for myometrial


masses. Size is estimated at 8.6 x 6.5 x 4.8 cm.


 


Cervix:  No abnormalities of significance are seen.


 


Endometrium:  Normal in thickness for the patient's age; 16.4 mm.


 


Right ovary / adnexa:  Normal in size estimated at 3 x 2.1 x 1.6 cm.  No 


evidence for masses, normal blood flow on Doppler interrogation.


 


Left ovary/adnexa: The ovary is not visualized.  There is no evidence of adnexal


mass or free fluid.


 


Cul-de-sac:  Small amount of free fluid is likely physiologic.  


 


 


RPTAT:HJJR


 


IMPRESSION:


 


1. Small amount of free fluid in the pelvic cul-de-sac is likely physiologic.


2.  The left ovary is not visualized.


3.  Otherwise unremarkable examination.


_____________________________________________ 


Physician Zelalem           Date    Time 


Electronically viewed and signed by Physician Zelalem on 02/04/2019 00:07 


 


D:  02/04/2019 00:07  T:  02/04/2019 00:07


JR/





CC: PHILIP YODER NP





PROCEDURE:   CT Abdomen and Pelvis with contrast. 


 


CLINICAL INDICATION:  Pelvic pain    


 


TECHNIQUE:   CT scan of the abdomen and pelvis with contrast was performed on a 


multi-detector high-resolution CT scanner.  The patient was scanned following 


intravenous administration of 95 mL Omnipaque-300 contrast. Coronal and sagittal


reformatted images obtained from the axial source images. Images were reviewed 


on a high-resolution PACS workstation. 


 


Exam CTDI 17.05 mGy


Exam DLP  997.09 mGy-cm


 


DICOM images are available.


One or more of the following dose reduction techniques were utilized:


1.) Automated exposure control


2.) Adjustment of the mA +/- kV according to patient's size


3.) Use of iterative reconstruction technique.


 


COMPARISON:   None. 


 


FINDINGS:


 


CT abdomen:


 


LOWER THORAX: Lung bases are clear.


 


LIVER AND GALLBLADDER: There is diminished density throughout the liver 


consistent with diffuse steatosis and the liver is mildly enlarged. No focal 


hepatic lesions otherwise demonstrated. The gallbladder is unremarkable.


 


SPLEEN: Normal.


 


PANCREAS: Normal.


 


ADRENAL GLANDS: Normal.


 


KIDNEYS: There are several tiny cortical cysts within the right kidney. The 


kidneys otherwise enhance symmetrically and there is no hydronephrosis or 


abnormal perinephric fluid.


 


VASCULATURE: Negative for aortic aneurysm or dissection.


 


LYMPH NODES: No significant retroperitoneal or mesenteric lymphadenopathy.


 


BOWEL AND MESENTERY: Stomach and small bowel are unremarkable.  The appendix is 


not clearly seen. No pericecal inflammatory changes. The proximal large bowel is


filled with stool. The colon is otherwise unremarkable.


 


 


CT pelvis:


 


The urinary bladder is unremarkable. There is trace free fluid in the pelvis. 


Uterus and adnexal structures appear normal for age. No significant pelvic 


lymphadenopathy.


 


Bones: Regional bones and superficial soft tissues are grossly unremarkable.


 


 


IMPRESSION:


 


1. No definite inflammatory process in the imaged abdomen or pelvis.


2. Trace pelvic free fluid, nonspecific and likely physiologic.


3. Proximal large bowel filled with stool, question constipation.


4. Mildly enlarged fatty infiltrated liver.


  


 


RPTAT: HJBB


_____________________________________________ 


Physician Jake           Date    Time 


Electronically viewed and signed by Physician Jake on 02/04/2019 


03:08 


 


D:  02/04/2019 03:08  T:  02/04/2019 03:08


xB/





CC: PHILIP YODER NP





Procedures/MDM


33-year-old female present ED with left-sided pelvic pain.  Patient had one 


positive home pregnancy test, and 2 negative tests since.  Her beta hCG quant 


today is less than 2.7, I doubt the patient is pregnant.  





CBC:   no e/o of systemic infection or severe anemia


BMP:   no e/o severe acidosis, alkalosis, renal failure


Urine:   no e/o acute infection or hematuria





Pelvic ultrasound is obtained, however, the ultrasound did not visualize left 


ovary.  I am unable to rule out ovarian torsion.  I discussed patient with Dr. Gonzales, who suggested that we obtain a CT abdomen pelvis with IV contrast.  


Again, no acute findings are noted on CT, except for proximal large bowel filled


with stool, consistent with constipation.





Patient is noted to have cervical motion tenderness pelvic exam.  It is possible


that her pain is due to pelvic inflammatory disease.  Rocephin 250 mg IM given 


to the patient in the ED.  Patient will be prescribed doxycycline.





Patient also advised to follow-up with PCP or return to ED if her pain does not 


resolve.





 Patient appears well, stable for discharge and outpatient management. Medical 


decision making shared with patient and family. Education provided to patient 


and family. Patient and family expressed understanding of the plan.





Medications on discharge: Ibuprofen, doxycycline, magnesium citrate, docusate 


sodium.


Follow-up: Primary care provider in 2-3 days or return to ED if worse.





Disclaimer: Inadvertent spelling and grammatical errors are likely due to 


EHR/dictation software use and do not reflect on the overall quality of patient 


care. Also, please note that the electronic time recorded on this note does not 


necessarily reflect the actual time of the patient encounter.





Departure


Diagnosis:  


   Primary Impression:  


   Pelvic pain


   Additional Impression:  


   Constipation


Condition:  Stable











PHILIP YODER NP                Feb 3, 2019 22:07

## 2019-02-04 VITALS — DIASTOLIC BLOOD PRESSURE: 52 MMHG | SYSTOLIC BLOOD PRESSURE: 92 MMHG | HEART RATE: 72 BPM | RESPIRATION RATE: 18 BRPM

## 2019-02-04 LAB
ADD MAN DIFF?: NO
ANION GAP: 8 (ref 5–13)
BASOPHIL #: 0.1 10^3/UL (ref 0–0.1)
BASOPHILS %: 0.6 % (ref 0–2)
BLOOD UREA NITROGEN: 17 MG/DL (ref 7–20)
CALCIUM: 9.3 MG/DL (ref 8.4–10.2)
CARBON DIOXIDE: 25 MMOL/L (ref 21–31)
CHLORIDE: 107 MMOL/L (ref 97–110)
CREATININE: 0.56 MG/DL (ref 0.44–1)
EOSINOPHILS #: 0.2 10^3/UL (ref 0–0.5)
EOSINOPHILS %: 2.1 % (ref 0–7)
GLUCOSE: 92 MG/DL (ref 70–220)
HEMATOCRIT: 36.1 % (ref 37–47)
HEMOGLOBIN: 11.7 G/DL (ref 12–16)
IMMATURE GRANS #M: 0.05 10^3/UL (ref 0–0.03)
IMMATURE GRANS % (M): 0.5 % (ref 0–0.43)
LYMPHOCYTES #: 3.1 10^3/UL (ref 0.8–2.9)
LYMPHOCYTES %: 32.9 % (ref 15–51)
MEAN CORPUSCULAR HEMOGLOBIN: 28.9 PG (ref 29–33)
MEAN CORPUSCULAR HGB CONC: 32.4 G/DL (ref 32–37)
MEAN CORPUSCULAR VOLUME: 89.1 FL (ref 82–101)
MEAN PLATELET VOLUME: 10.1 FL (ref 7.4–10.4)
MONOCYTE #: 0.5 10^3/UL (ref 0.3–0.9)
MONOCYTES %: 5.5 % (ref 0–11)
NEUTROPHIL #: 5.5 10^3/UL (ref 1.6–7.5)
NEUTROPHILS %: 58.4 % (ref 39–77)
NUCLEATED RED BLOOD CELLS #: 0 10^3/UL (ref 0–0)
NUCLEATED RED BLOOD CELLS%: 0 /100WBC (ref 0–0)
PLATELET COUNT: 238 10^3/UL (ref 140–415)
POTASSIUM: 4.1 MMOL/L (ref 3.5–5.1)
RED BLOOD COUNT: 4.05 10^6/UL (ref 4.2–5.4)
RED CELL DISTRIBUTION WIDTH: 13.8 % (ref 11.5–14.5)
SODIUM: 140 MMOL/L (ref 135–144)
WHITE BLOOD COUNT: 9.4 10^3/UL (ref 4.8–10.8)

## 2019-02-04 RX ADMIN — CEFTRIAXONE SODIUM 1 MG: 250 INJECTION, POWDER, FOR SOLUTION INTRAMUSCULAR; INTRAVENOUS at 04:01

## 2019-02-04 RX ADMIN — IOHEXOL 1 ML: 300 INJECTION, SOLUTION INTRAVENOUS at 01:22

## 2019-02-04 RX ADMIN — VASOPRESSIN 1: 20 INJECTION, SOLUTION INTRAMUSCULAR; SUBCUTANEOUS at 01:21
